# Patient Record
Sex: MALE | Race: WHITE | NOT HISPANIC OR LATINO | Employment: FULL TIME | URBAN - METROPOLITAN AREA
[De-identification: names, ages, dates, MRNs, and addresses within clinical notes are randomized per-mention and may not be internally consistent; named-entity substitution may affect disease eponyms.]

---

## 2019-11-15 ENCOUNTER — OFFICE VISIT (OUTPATIENT)
Dept: URGENT CARE | Facility: CLINIC | Age: 64
End: 2019-11-15
Payer: COMMERCIAL

## 2019-11-15 VITALS
HEIGHT: 68 IN | WEIGHT: 307 LBS | HEART RATE: 80 BPM | TEMPERATURE: 97.5 F | BODY MASS INDEX: 46.53 KG/M2 | RESPIRATION RATE: 16 BRPM | DIASTOLIC BLOOD PRESSURE: 82 MMHG | OXYGEN SATURATION: 95 % | SYSTOLIC BLOOD PRESSURE: 126 MMHG

## 2019-11-15 DIAGNOSIS — S51.812A LACERATION OF LEFT FOREARM, INITIAL ENCOUNTER: ICD-10-CM

## 2019-11-15 DIAGNOSIS — S51.812A SKIN TEAR OF LEFT FOREARM WITHOUT COMPLICATION, INITIAL ENCOUNTER: ICD-10-CM

## 2019-11-15 PROCEDURE — 99204 OFFICE O/P NEW MOD 45 MIN: CPT | Performed by: NURSE PRACTITIONER

## 2019-11-15 RX ORDER — OLOPATADINE HYDROCHLORIDE 665 UG/1
SPRAY NASAL
Refills: 5 | COMMUNITY
Start: 2019-11-01

## 2019-11-15 RX ORDER — SULFAMETHOXAZOLE AND TRIMETHOPRIM 800; 160 MG/1; MG/1
1 TABLET ORAL 2 TIMES DAILY
Qty: 14 TAB | Refills: 0 | Status: SHIPPED | OUTPATIENT
Start: 2019-11-15 | End: 2019-11-22

## 2019-11-15 RX ORDER — BUDESONIDE AND FORMOTEROL FUMARATE DIHYDRATE 160; 4.5 UG/1; UG/1
AEROSOL RESPIRATORY (INHALATION)
Refills: 3 | COMMUNITY
Start: 2019-10-30

## 2019-11-15 RX ORDER — FINASTERIDE 5 MG/1
5 TABLET, FILM COATED ORAL
COMMUNITY
Start: 2019-02-14

## 2019-11-15 RX ORDER — COLESEVELAM 180 1/1
1875 TABLET ORAL
COMMUNITY
Start: 2019-09-20

## 2019-11-15 RX ORDER — LISINOPRIL AND HYDROCHLOROTHIAZIDE 12.5; 1 MG/1; MG/1
1 TABLET ORAL DAILY
COMMUNITY

## 2019-11-15 RX ORDER — ZAFIRLUKAST 20 MG/1
TABLET, FILM COATED ORAL
COMMUNITY
Start: 2018-12-19

## 2019-11-15 RX ORDER — ASPIRIN 81 MG/1
81 TABLET, CHEWABLE ORAL DAILY
COMMUNITY

## 2019-11-16 NOTE — PROGRESS NOTES
Subjective:     Mario Slaughter is a 64 y.o. male who presents for Laceration (x today, skin tear on Lt. arm)       Laceration    His tetanus status is UTD.     Patient reports that around 1 PM today he accidentally tripped forward and scraped his left arm against a counter.  Has multiple bruises and skin tears.  His last tetanus shot was 2 and half years ago per patient.  Traveling from [place].    PMH:  has no past medical history on file.    MEDS:   Current Outpatient Medications:   •  zafirlukast (ACCOLATE) 20 MG Tab, TK 1 T PO BID, Disp: , Rfl:   •  SYMBICORT 160-4.5 MCG/ACT Aerosol, INHALE 2 PUFFS PO BID, Disp: , Rfl: 3  •  finasteride (PROSCAR) 5 MG Tab, Take 5 mg by mouth., Disp: , Rfl:   •  colesevelam (WELCHOL) 625 MG Tab, Take 1,875 mg by mouth., Disp: , Rfl:   •  esomeprazole (NEXIUM) 20 MG capsule, TAKE 1 CAPSULE BY MOUTH TWO TIMES DAILY, Disp: , Rfl:   •  Olopatadine HCl 0.6 % Solution, U 2 SPRAYS IEN BID UTD, Disp: , Rfl: 5  •  aspirin (ASA) 81 MG Chew Tab chewable tablet, Take 81 mg by mouth every day., Disp: , Rfl:   •  lisinopril-hydrochlorothiazide (PRINZIDE) 10-12.5 MG per tablet, Take 1 Tab by mouth every day., Disp: , Rfl:   •  Empagliflozin (JARDIANCE) 10 MG Tab, Take  by mouth., Disp: , Rfl:   •  sulfamethoxazole-trimethoprim (BACTRIM DS) 800-160 MG tablet, Take 1 Tab by mouth 2 times a day for 7 days., Disp: 14 Tab, Rfl: 0    ALLERGIES:   Allergies   Allergen Reactions   • Levofloxacin    • Penicillins      SURGHX: History reviewed. No pertinent surgical history.    SOCHX:  reports that he has never smoked. He has never used smokeless tobacco.     FH: Reviewed with patient, not pertinent to this visit.    Review of Systems   Constitutional: Negative.  Negative for malaise/fatigue.   Respiratory: Negative.    Cardiovascular: Negative.    Skin:        Bruising, skin tears to left forearm   Neurological: Negative.  Negative for loss of consciousness and headaches.   All other systems  "reviewed and are negative.    Objective:     /82 (BP Location: Right arm, Patient Position: Sitting, BP Cuff Size: Large adult)   Pulse 80   Temp 36.4 °C (97.5 °F) (Temporal)   Resp 16   Ht 1.727 m (5' 8\")   Wt (!) 139.3 kg (307 lb)   SpO2 95%   BMI 46.68 kg/m²     Physical Exam  Vitals signs reviewed.   Constitutional:       General: He is not in acute distress.     Appearance: He is well-developed. He is not toxic-appearing or diaphoretic.   HENT:      Head: Normocephalic.      Right Ear: External ear normal.      Left Ear: External ear normal.      Nose: Nose normal.   Eyes:      Extraocular Movements: Extraocular movements intact.      Conjunctiva/sclera: Conjunctivae normal.   Neck:      Musculoskeletal: Normal range of motion.   Cardiovascular:      Rate and Rhythm: Normal rate.   Pulmonary:      Effort: Pulmonary effort is normal. No respiratory distress.   Musculoskeletal: Normal range of motion.         General: No deformity.      Left elbow: Normal. He exhibits normal range of motion.      Left wrist: Normal. He exhibits normal range of motion.      Left forearm: He exhibits laceration. He exhibits no tenderness, no bony tenderness, no swelling and no deformity.      Left hand: He exhibits normal capillary refill. Normal sensation noted. Normal strength noted.   Skin:     General: Skin is warm and dry.      Coloration: Skin is not pale.      Findings: Laceration present.      Comments: Multiple bruises and small skin tears at left arm   Neurological:      Mental Status: He is alert and oriented to person, place, and time.      Sensory: No sensory deficit.      Coordination: Coordination normal.      Gait: Gait normal.   Psychiatric:         Behavior: Behavior normal. Behavior is cooperative.          Assessment/Plan:     1. Skin tear of left forearm without complication, initial encounter    2. Laceration of left forearm, initial encounter  - sulfamethoxazole-trimethoprim (BACTRIM DS) 800-160 " MG tablet; Take 1 Tab by mouth 2 times a day for 7 days.  Dispense: 14 Tab; Refill: 0    Wound care performed.  Devitalized tissue excised.  Xeroform dressing applied and wrapped in Kerlix dressing.    History of type 2 diabetes.  Prophylactic antibiotics sent electronically to pharmacy.  Patient reports allergy to penicillin Keflex.  Reports tolerating Bactrim in the past.    Instructed to return in 2 days for wound check.    Discussed close monitoring and supportive measures including increasing fluids and rest as well as OTC symptom management.    Patient advised to: Return for 1) Symptoms don't improve or worsen, or go to ER, 2) Follow up with primary care in 7-10 days.    Differential diagnosis, natural history, supportive care, and indications for immediate follow-up discussed. All questions answered. Patient agrees with the plan of care.

## 2019-11-17 ASSESSMENT — ENCOUNTER SYMPTOMS
CARDIOVASCULAR NEGATIVE: 1
HEADACHES: 0
NEUROLOGICAL NEGATIVE: 1
LOSS OF CONSCIOUSNESS: 0
RESPIRATORY NEGATIVE: 1
CONSTITUTIONAL NEGATIVE: 1

## 2019-11-17 NOTE — PATIENT INSTRUCTIONS
Skin Tear Care  A skin tear is a wound in which the top layers of skin have peeled off the deeper skin or tissues underneath them. This is a common problem as people get older because the skin becomes thinner and more fragile. In addition, some medicines, such as oral corticosteroids, can lead to skin thinning if they are taken for long periods of time.  A skin tear is often repaired with tape or skin adhesive strips. Depending on the location of the wound, a bandage (dressing) may be applied over the tape or skin adhesive strips.  How is this treated?  Wound Care  · Clean the wound as told by your health care provider. You may be instructed to keep the wound dry for the first few days. If you are told to clean the wound:  ¨ Wash the wound with mild soap and water or a salt-water (saline) solution.  ¨ Rinse the wound with water to remove all soap.  ¨ Do not rub the wound dry. Let the wound air dry.  · Change any dressings as told by your health care provider. This includes changing the dressing if it gets wet, gets dirty, or starts to smell bad.  · Do not scratch or pick at the wound.  · Protect the injured area until it has healed.  · Check your wound every day for signs of infection. Check for:  ¨ More redness, swelling, or pain.  ¨ More fluid or blood.  ¨ Warmth.  ¨ Pus or a bad smell.  Medicines   · Take over-the-counter and prescription medicines only as told by your health care provider.  · If you were prescribed an antibiotic medicine, take or apply it as told by your health care provider. Do not stop using the antibiotic even if your condition improves.  General Instructions  · Keep the dressing dry as told by your health care provider.  · Do not take baths, swim, or do anything that puts your wound underwater until your health care provider approves.  · Keep all follow-up visits as told by your health care provider. This is important.  Contact a health care provider if:  · You have more redness, swelling,  or pain around your wound.  · You have more fluid or blood coming from your wound.  · Your wound feels warm to the touch.  · You have pus or a bad smell coming from your wound.  Get help right away if:  · You have a red streak that goes away from the skin tear.  · You have a fever and chills and your symptoms suddenly get worse.  This information is not intended to replace advice given to you by your health care provider. Make sure you discuss any questions you have with your health care provider.  Document Released: 09/12/2002 Document Revised: 08/13/2017 Document Reviewed: 11/07/2016  [email or web address] Interactive Patient Education © 2017 [employer]

## 2019-11-18 ENCOUNTER — OFFICE VISIT (OUTPATIENT)
Dept: URGENT CARE | Facility: CLINIC | Age: 64
End: 2019-11-18
Payer: COMMERCIAL

## 2019-11-18 VITALS
RESPIRATION RATE: 16 BRPM | OXYGEN SATURATION: 98 % | HEIGHT: 68 IN | SYSTOLIC BLOOD PRESSURE: 122 MMHG | DIASTOLIC BLOOD PRESSURE: 84 MMHG | WEIGHT: 308.8 LBS | HEART RATE: 68 BPM | TEMPERATURE: 97.7 F | BODY MASS INDEX: 46.8 KG/M2

## 2019-11-18 DIAGNOSIS — S51.812A SKIN TEAR OF LEFT FOREARM WITHOUT COMPLICATION, INITIAL ENCOUNTER: ICD-10-CM

## 2019-11-18 PROCEDURE — 99212 OFFICE O/P EST SF 10 MIN: CPT | Performed by: PHYSICIAN ASSISTANT

## 2019-11-19 NOTE — PROGRESS NOTES
Subjective:      Pt is a 64 y.o. male who presents with Wound Check (wound check on lt. arm)            HPI  This is a new problem. PT seen 3 days ago for skin tear of left forearm and placed on Bactrim. Patient reports that around 1 PM on that day prior, he accidentally tripped forward and scraped his left arm against a counter.  Has multiple bruises and skin tears.  His last tetanus shot was 2 and half years ago per patient.  Traveling from [place]. Pt states the pain is a 4/10, aching in nature and worse at night. Pt denies CP, SOB, NVD, paresthesias, headaches, dizziness, change in vision, hives, or other joint pain. The pt's medication list, problem list, and allergies have been evaluated and reviewed during today's visit.      PMH:  Negative per pt.      PSH:  Negative per pt.      Fam Hx:  the patient's family history is not pertinent to their current complaint      Soc HX:  Social History     Socioeconomic History   • Marital status: Single     Spouse name: Not on file   • Number of children: Not on file   • Years of education: Not on file   • Highest education level: Not on file   Occupational History   • Not on file   Social Needs   • Financial resource strain: Not on file   • Food insecurity:     Worry: Not on file     Inability: Not on file   • Transportation needs:     Medical: Not on file     Non-medical: Not on file   Tobacco Use   • Smoking status: Never Smoker   • Smokeless tobacco: Never Used   Substance and Sexual Activity   • Alcohol use: Not on file   • Drug use: Not on file   • Sexual activity: Not on file   Lifestyle   • Physical activity:     Days per week: Not on file     Minutes per session: Not on file   • Stress: Not on file   Relationships   • Social connections:     Talks on phone: Not on file     Gets together: Not on file     Attends Faith service: Not on file     Active member of club or organization: Not on file     Attends meetings of clubs or organizations: Not on file      Relationship status: Not on file   • Intimate partner violence:     Fear of current or ex partner: Not on file     Emotionally abused: Not on file     Physically abused: Not on file     Forced sexual activity: Not on file   Other Topics Concern   • Not on file   Social History Narrative   • Not on file         Medications:    Current Outpatient Medications:   •  zafirlukast (ACCOLATE) 20 MG Tab, TK 1 T PO BID, Disp: , Rfl:   •  SYMBICORT 160-4.5 MCG/ACT Aerosol, INHALE 2 PUFFS PO BID, Disp: , Rfl: 3  •  finasteride (PROSCAR) 5 MG Tab, Take 5 mg by mouth., Disp: , Rfl:   •  colesevelam (WELCHOL) 625 MG Tab, Take 1,875 mg by mouth., Disp: , Rfl:   •  esomeprazole (NEXIUM) 20 MG capsule, TAKE 1 CAPSULE BY MOUTH TWO TIMES DAILY, Disp: , Rfl:   •  Olopatadine HCl 0.6 % Solution, U 2 SPRAYS IEN BID UTD, Disp: , Rfl: 5  •  aspirin (ASA) 81 MG Chew Tab chewable tablet, Take 81 mg by mouth every day., Disp: , Rfl:   •  lisinopril-hydrochlorothiazide (PRINZIDE) 10-12.5 MG per tablet, Take 1 Tab by mouth every day., Disp: , Rfl:   •  Empagliflozin (JARDIANCE) 10 MG Tab, Take  by mouth., Disp: , Rfl:   •  sulfamethoxazole-trimethoprim (BACTRIM DS) 800-160 MG tablet, Take 1 Tab by mouth 2 times a day for 7 days., Disp: 14 Tab, Rfl: 0      Allergies:  Levofloxacin and Penicillins    ROS  Constitutional: Negative for fever, chills and malaise/fatigue.   HENT: Negative for congestion and sore throat.    Eyes: Negative for blurred vision, double vision and photophobia.   Respiratory: Negative for cough and shortness of breath.  Cardiovascular: Negative for chest pain and palpitations.   Gastrointestinal: Negative for heartburn, nausea, vomiting, abdominal pain, diarrhea and constipation.   Genitourinary: Negative for dysuria and flank pain.   Musculoskeletal: Negative for joint pain and myalgias.   Skin:+left forearm skin tear  Neurological: Negative for dizziness, tingling and headaches.   Endo/Heme/Allergies: Does not bruise/bleed  "easily.   Psychiatric/Behavioral: Negative for depression. The patient is not nervous/anxious.           Objective:     /84 (BP Location: Right arm, Patient Position: Sitting, BP Cuff Size: Large adult)   Pulse 68   Temp 36.5 °C (97.7 °F) (Temporal)   Resp 16   Ht 1.727 m (5' 8\")   Wt (!) 140.1 kg (308 lb 12.8 oz)   SpO2 98%   BMI 46.95 kg/m²      Physical Exam          Constitutional: PT is oriented to person, place, and time. PT appears well-developed and well-nourished. No distress.   HENT:   Head: Normocephalic and atraumatic.   Mouth/Throat: Oropharynx is clear and moist. No oropharyngeal exudate.   Eyes: Conjunctivae normal and EOM are normal. Pupils are equal, round, and reactive to light.   Neck: Normal range of motion. Neck supple. No thyromegaly present.   Cardiovascular: Normal rate, regular rhythm, normal heart sounds and intact distal pulses.  Exam reveals no gallop and no friction rub.    No murmur heard.  Pulmonary/Chest: Effort normal and breath sounds normal. No respiratory distress. PT has no wheezes. PT has no rales. Pt exhibits no tenderness.   Abdominal: Soft. Bowel sounds are normal. PT exhibits no distension and no mass. There is no tenderness. There is no rebound and no guarding.   Musculoskeletal: Normal range of motion. PT exhibits no edema and no tenderness.   Neurological: PT is alert and oriented to person, place, and time. PT has normal reflexes. No cranial nerve deficit.   Skin: Skin is warm and dry. No rash noted. PT is not diaphoretic. +skin tear left forearm      Psychiatric: PT has a normal mood and affect. PT behavior is normal. Judgment and thought content normal.     Assessment/Plan:       1. Skin tear of left forearm without complication, initial encounter    Continue Bactrim  Wound cleansed and dressed in clinic today  RICE therapy discussed  Gentle ROM exercises discussed  WBAT left UE  Ice/heat therapy discussed  OTC ibuprofen for pain control  Rest, fluids " encouraged.  AVS with medical info given.  Pt was in full understanding and agreement with the plan.  Follow-up as needed if symptoms worsen or fail to improve.